# Patient Record
Sex: FEMALE | Race: WHITE | NOT HISPANIC OR LATINO | URBAN - METROPOLITAN AREA
[De-identification: names, ages, dates, MRNs, and addresses within clinical notes are randomized per-mention and may not be internally consistent; named-entity substitution may affect disease eponyms.]

---

## 2017-10-04 ENCOUNTER — EMERGENCY (EMERGENCY)
Facility: HOSPITAL | Age: 27
LOS: 1 days | Discharge: HOME | End: 2017-10-04
Attending: EMERGENCY MEDICINE | Admitting: EMERGENCY MEDICINE
Payer: COMMERCIAL

## 2017-10-04 VITALS
DIASTOLIC BLOOD PRESSURE: 75 MMHG | RESPIRATION RATE: 16 BRPM | TEMPERATURE: 98 F | SYSTOLIC BLOOD PRESSURE: 115 MMHG | HEART RATE: 75 BPM

## 2017-10-04 VITALS
WEIGHT: 179.9 LBS | HEART RATE: 86 BPM | TEMPERATURE: 99 F | SYSTOLIC BLOOD PRESSURE: 106 MMHG | DIASTOLIC BLOOD PRESSURE: 67 MMHG | RESPIRATION RATE: 16 BRPM | HEIGHT: 65 IN

## 2017-10-04 PROCEDURE — 99283 EMERGENCY DEPT VISIT LOW MDM: CPT

## 2017-10-04 PROCEDURE — 73610 X-RAY EXAM OF ANKLE: CPT

## 2017-10-04 PROCEDURE — 73630 X-RAY EXAM OF FOOT: CPT | Mod: 26,LT

## 2017-10-04 PROCEDURE — 73610 X-RAY EXAM OF ANKLE: CPT | Mod: 26,LT

## 2017-10-04 PROCEDURE — 73630 X-RAY EXAM OF FOOT: CPT

## 2017-10-04 PROCEDURE — 99284 EMERGENCY DEPT VISIT MOD MDM: CPT | Mod: 25

## 2017-10-04 NOTE — ED PROVIDER NOTE - MEDICAL DECISION MAKING DETAILS
25 y/o F presents to the ED for L. ankle pain, s/p twisting, will order poc UCG, xray L. foot and ankle, offered pain medications, pt declined states she is comfortable.

## 2017-10-04 NOTE — ED PROVIDER NOTE - CHPI ED SYMPTOMS NEG
no weakness/no chills/no numbness/no vomiting/no dizziness/no tingling/no decreased eating/drinking/no fever/no nausea

## 2017-10-04 NOTE — ED PROVIDER NOTE - OBJECTIVE STATEMENT
25 y/o F w/ no pmh presents to the ED for L. ankle pain s/p twisting her ankle at work appx 1 hours ago, now reports pain and swelling to the L. lateral malleolus. Pt denies any other injuries or medical complain at this time. Denies any f/c, n/v/d , sob, cp, abd pain, weakness/numbness to the LE.

## 2017-10-04 NOTE — ED PROVIDER NOTE - PROGRESS NOTE DETAILS
Attending Contribution to Care:  27 y/o F twisted l ankle today c/o pain. PE minor edema and tenderness l lateral malleolus. full ROM intact. Plan: x-ray, ice, NSAIDs, ortho followup. Attending Contribution to Care:  27 y/o F twisted left ankle today c/o pain. PE minor edema and tenderness left lateral malleolus. full ROM intact. Plan: x-ray, ice, NSAIDs, ortho followup. Xrays results reviewed w/ pt, pt placed in an ace bandage, offered cruthes pt declined states she has them at home, advised its an ankle sprain, advised to take motrin/tylenol for pain and RICE therapy, advised to follow up with PMD if symptoms cont or don't improved or return to the ED, pt states she understands and agrees with assessment and plan, all questions answered, pt discharged home in stable condition.

## 2017-10-04 NOTE — ED PROVIDER NOTE - LOCATION
L. lateral malleous (+) L. lateral malleous swelling and tenderness, pt able to dorsiflex and plantar flex, pt able to fully flex and extend knee, no foot/digits/tib-fib or knee tenderness, FROM, NVI,  (+) dp pulses and 2+/ankle